# Patient Record
Sex: MALE | Race: BLACK OR AFRICAN AMERICAN
[De-identification: names, ages, dates, MRNs, and addresses within clinical notes are randomized per-mention and may not be internally consistent; named-entity substitution may affect disease eponyms.]

---

## 2020-10-19 ENCOUNTER — HOSPITAL ENCOUNTER (OUTPATIENT)
Dept: HOSPITAL 74 - JASU-SURG | Age: 70
Discharge: HOME | End: 2020-10-19
Attending: UROLOGY
Payer: COMMERCIAL

## 2020-10-19 VITALS — DIASTOLIC BLOOD PRESSURE: 76 MMHG | SYSTOLIC BLOOD PRESSURE: 137 MMHG | TEMPERATURE: 97.8 F | HEART RATE: 46 BPM

## 2020-10-19 VITALS — BODY MASS INDEX: 24.3 KG/M2

## 2020-10-19 DIAGNOSIS — N20.0: Primary | ICD-10-CM

## 2020-10-19 PROCEDURE — 0TF4XZZ FRAGMENTATION IN LEFT KIDNEY PELVIS, EXTERNAL APPROACH: ICD-10-PCS | Performed by: UROLOGY

## 2020-10-19 NOTE — OP
Operative Note





- Note:


Operative Date: 10/19/20


Pre-Operative Diagnosis: Left renal stone


Operation: Left ESWL


Findings: 





5 mm mid pole Left renal stone


Post-Operative Diagnosis: Same as Pre-op


Surgeon: Norman Frye


Anesthesia: Regional


Estimated Blood Loss (mls): 0


Operative Report Dictated: Yes

## 2020-10-19 NOTE — OP
DATE OF OPERATION:  10/19/2020

 

PREOPERATIVE DIAGNOSIS:  Left renal stone. 

 

POSTOPERATIVE DIAGNOSIS:  Left renal stone.  

 

PROCEDURE:  Left extracorporeal shockwave lithotripsy.  

 

ATTENDING:  Joshua Frye M.D. 

 

ANESTHESIA:  Fractional.  

 

DESCRIPTION OF PROCEDURE:  Patient was brought in the operating room, placed in a

supine position on the operating room table.  Ultrasonography and fluoroscopy were

performed.  A 5-mm left mid pole stone was identified.  Anesthesia and preoperative

antibiotics were then administered.  Shockwave lithotripsy was then started.  2500

impulses at 17 joules of power were administered to the stone under fluoroscopic and

ultrasonographic visualization.  Excellent fragmentation was noted.  There were no

complications noted.  

 

DISPOSITION:  To recovery room.  

 

 

JOSHUA NORRIS M.D.

 

[id number]

DD: 10/19/2020 16:49

DT: 10/19/2020 19:03

Job #:  [id number]

## 2023-03-10 ENCOUNTER — OFFICE (OUTPATIENT)
Dept: URBAN - METROPOLITAN AREA CLINIC 29 | Facility: CLINIC | Age: 73
Setting detail: OPHTHALMOLOGY
End: 2023-03-10
Payer: COMMERCIAL

## 2023-03-10 DIAGNOSIS — H16.223: ICD-10-CM

## 2023-03-10 DIAGNOSIS — H25.13: ICD-10-CM

## 2023-03-10 DIAGNOSIS — H40.003: ICD-10-CM

## 2023-03-10 PROCEDURE — 92083 EXTENDED VISUAL FIELD XM: CPT | Performed by: OPHTHALMOLOGY

## 2023-03-10 PROCEDURE — 92020 GONIOSCOPY: CPT | Performed by: OPHTHALMOLOGY

## 2023-03-10 PROCEDURE — 99213 OFFICE O/P EST LOW 20 MIN: CPT | Performed by: OPHTHALMOLOGY

## 2023-03-10 PROCEDURE — 92250 FUNDUS PHOTOGRAPHY W/I&R: CPT | Performed by: OPHTHALMOLOGY

## 2023-03-10 ASSESSMENT — TONOMETRY
OD_IOP_MMHG: 16
OS_IOP_MMHG: 19

## 2023-03-10 ASSESSMENT — VISUAL ACUITY
OS_BCVA: 20/20-2
OD_BCVA: 20/25-2

## 2023-03-10 ASSESSMENT — DECREASING TEAR LAKE - SEVERITY SCORE
OS_DEC_TEARLAKE: T
OD_DEC_TEARLAKE: T

## 2023-03-10 ASSESSMENT — CONFRONTATIONAL VISUAL FIELD TEST (CVF)
OS_FINDINGS: FULL
OD_FINDINGS: FULL

## 2023-05-23 ASSESSMENT — REFRACTION_AUTOREFRACTION
OD_AXIS: 173
OS_CYLINDER: +1.50
OS_SPHERE: +1.50
OD_CYLINDER: +2.00
OS_AXIS: 168
OD_SPHERE: +1.00

## 2023-05-23 ASSESSMENT — REFRACTION_CURRENTRX
OD_SPHERE: +1.00
OS_SPHERE: +1.25
OD_ADD: +2.75
OD_AXIS: 175
OS_SPHERE: +1.25
OD_AXIS: 5
OS_ADD: +3.00
OS_CYLINDER: SPH
OS_OVR_VA: 20/
OD_VPRISM_DIRECTION: PROGS
OS_CYLINDER: +0.50
OD_CYLINDER: +1.50
OD_ADD: +3.00
OS_ADD: +2.75
OD_SPHERE: +0.25
OS_AXIS: 159
OD_OVR_VA: 20/
OD_CYLINDER: +1.50
OD_OVR_VA: 20/
OS_OVR_VA: 20/
OS_VPRISM_DIRECTION: PROGS

## 2023-05-23 ASSESSMENT — REFRACTION_MANIFEST
OS_VA2: 20/20(J1+)
OS_VA1: 20/20
OD_SPHERE: +1.00
OS_SPHERE: +1.25
OD_VA1: 20/20
OU_VA: 20/20
OD_AXIS: 180
OD_CYLINDER: +2.00
OS_CYLINDER: +0.75
OD_ADD: +3.00
OS_ADD: +3.00
OD_VA2: 20/20(J1+)
OS_AXIS: 170

## 2023-05-23 ASSESSMENT — SPHEQUIV_DERIVED
OS_SPHEQUIV: 1.625
OS_SPHEQUIV: 2.25
OD_SPHEQUIV: 2
OD_SPHEQUIV: 2

## 2023-09-05 ENCOUNTER — OFFICE (OUTPATIENT)
Dept: URBAN - METROPOLITAN AREA CLINIC 29 | Facility: CLINIC | Age: 73
Setting detail: OPHTHALMOLOGY
End: 2023-09-05
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
## 2023-09-05 PROCEDURE — NO SHOW FE NO SHOW FEE: Performed by: OPHTHALMOLOGY

## 2023-10-30 ENCOUNTER — OFFICE (OUTPATIENT)
Dept: URBAN - METROPOLITAN AREA CLINIC 29 | Facility: CLINIC | Age: 73
Setting detail: OPHTHALMOLOGY
End: 2023-10-30
Payer: COMMERCIAL

## 2023-10-30 ENCOUNTER — RX ONLY (RX ONLY)
Age: 73
End: 2023-10-30

## 2023-10-30 DIAGNOSIS — H43.393: ICD-10-CM

## 2023-10-30 DIAGNOSIS — H40.003: ICD-10-CM

## 2023-10-30 DIAGNOSIS — H16.223: ICD-10-CM

## 2023-10-30 DIAGNOSIS — H11.153: ICD-10-CM

## 2023-10-30 DIAGNOSIS — H25.13: ICD-10-CM

## 2023-10-30 PROCEDURE — 92133 CPTRZD OPH DX IMG PST SGM ON: CPT | Performed by: OPHTHALMOLOGY

## 2023-10-30 PROCEDURE — 92014 COMPRE OPH EXAM EST PT 1/>: CPT | Performed by: OPHTHALMOLOGY

## 2023-10-30 ASSESSMENT — REFRACTION_MANIFEST
OD_CYLINDER: +2.00
OD_SPHERE: +1.00
OD_ADD: +3.00
OS_SPHERE: +1.25
OS_ADD: +3.00
OD_VA2: 20/20(J1+)
OS_AXIS: 170
OD_VA1: 20/20
OD_AXIS: 180
OS_VA1: 20/20
OU_VA: 20/20
OS_VA2: 20/20(J1+)
OS_CYLINDER: +0.75

## 2023-10-30 ASSESSMENT — DECREASING TEAR LAKE - SEVERITY SCORE
OD_DEC_TEARLAKE: T
OS_DEC_TEARLAKE: T

## 2023-10-30 ASSESSMENT — REFRACTION_CURRENTRX
OD_CYLINDER: +1.50
OD_OVR_VA: 20/
OD_CYLINDER: +1.50
OD_OVR_VA: 20/
OS_SPHERE: +1.25
OD_ADD: +2.75
OS_CYLINDER: SPH
OS_ADD: +3.00
OS_CYLINDER: +0.50
OS_SPHERE: +1.25
OD_AXIS: 5
OD_VPRISM_DIRECTION: PROGS
OS_VPRISM_DIRECTION: PROGS
OD_ADD: +3.00
OS_OVR_VA: 20/
OD_AXIS: 175
OS_AXIS: 159
OD_SPHERE: +1.00
OS_OVR_VA: 20/
OD_SPHERE: +0.25
OS_ADD: +2.75

## 2023-10-30 ASSESSMENT — REFRACTION_AUTOREFRACTION
OD_SPHERE: +1.00
OS_CYLINDER: +1.00
OD_AXIS: 180
OS_AXIS: 175
OS_SPHERE: +1.25
OD_CYLINDER: +1.50

## 2023-10-30 ASSESSMENT — VISUAL ACUITY
OS_BCVA: 20/25-2
OD_BCVA: 20/20-1

## 2023-10-30 ASSESSMENT — TONOMETRY
OS_IOP_MMHG: 18
OD_IOP_MMHG: 15

## 2023-10-30 ASSESSMENT — SPHEQUIV_DERIVED
OS_SPHEQUIV: 1.75
OD_SPHEQUIV: 1.75
OD_SPHEQUIV: 2
OS_SPHEQUIV: 1.625

## 2023-10-30 ASSESSMENT — CONFRONTATIONAL VISUAL FIELD TEST (CVF)
OS_FINDINGS: FULL
OD_FINDINGS: FULL

## 2024-02-09 ENCOUNTER — OFFICE (OUTPATIENT)
Dept: URBAN - METROPOLITAN AREA CLINIC 29 | Facility: CLINIC | Age: 74
Setting detail: OPHTHALMOLOGY
End: 2024-02-09
Payer: COMMERCIAL

## 2024-02-09 DIAGNOSIS — H52.4: ICD-10-CM

## 2024-02-09 PROCEDURE — 92015 DETERMINE REFRACTIVE STATE: CPT | Performed by: OPHTHALMOLOGY

## 2024-02-09 ASSESSMENT — REFRACTION_CURRENTRX
OS_AXIS: 159
OD_ADD: +2.75
OS_CYLINDER: +0.50
OD_OVR_VA: 20/
OS_SPHERE: +1.25
OS_ADD: +3.00
OS_ADD: +2.75
OD_SPHERE: +1.00
OD_AXIS: 175
OD_CYLINDER: +1.50
OD_CYLINDER: +1.50
OS_AXIS: 166
OS_OVR_VA: 20/
OS_SPHERE: +1.25
OD_VPRISM_DIRECTION: PROGS
OD_OVR_VA: 20/
OD_CYLINDER: +1.25
OS_ADD: +3.00
OS_SPHERE: +1.25
OD_SPHERE: +1.00
OD_OVR_VA: 20/
OD_ADD: +3.00
OS_CYLINDER: +0.50
OS_AXIS: 157
OS_OVR_VA: 20/
OS_CYLINDER: SPH
OS_SPHERE: +1.25
OD_ADD: +3.00
OD_SPHERE: +1.00
OD_SPHERE: +0.25
OS_VPRISM_DIRECTION: PROGS
OS_CYLINDER: +0.50
OD_ADD: +3.00
OS_ADD: +3.00
OD_AXIS: 4
OS_OVR_VA: 20/
OD_CYLINDER: +1.50
OD_AXIS: 5
OD_AXIS: 180

## 2024-02-09 ASSESSMENT — REFRACTION_MANIFEST
OD_ADD: +3.00
OD_VA2: 20/20(J1+)
OU_VA: 20/20
OS_VA1: 20/20
OD_CYLINDER: +1.75
OS_AXIS: 170
OS_SPHERE: +1.25
OS_VA1: 20/20
OD_ADD: +3.00
OD_CYLINDER: +2.00
OS_AXIS: 170
OD_SPHERE: +1.00
OD_VA2: 20/20(J1+)
OS_ADD: +3.00
OD_AXIS: 180
OS_CYLINDER: +1.50
OS_CYLINDER: +0.75
OD_AXIS: 180
OD_VA1: 20/20
OS_SPHERE: +1.25
OS_ADD: +3.00
OD_SPHERE: +1.00
OS_VA2: 20/20(J1+)
OS_VA2: 20/20(J1+)
OU_VA: 20/15-1
OD_VA1: 20/20

## 2024-02-09 ASSESSMENT — REFRACTION_AUTOREFRACTION
OD_SPHERE: +1.00
OS_SPHERE: +1.50
OS_AXIS: 172
OD_AXIS: 175
OS_CYLINDER: +1.25
OD_CYLINDER: +1.75

## 2024-02-09 ASSESSMENT — DECREASING TEAR LAKE - SEVERITY SCORE
OD_DEC_TEARLAKE: T
OS_DEC_TEARLAKE: T

## 2024-02-09 ASSESSMENT — CONFRONTATIONAL VISUAL FIELD TEST (CVF)
OD_FINDINGS: FULL
OS_FINDINGS: FULL

## 2024-02-09 ASSESSMENT — SPHEQUIV_DERIVED
OD_SPHEQUIV: 1.875
OD_SPHEQUIV: 2
OS_SPHEQUIV: 2.125
OS_SPHEQUIV: 2
OD_SPHEQUIV: 1.875
OS_SPHEQUIV: 1.625

## 2024-04-30 ENCOUNTER — OFFICE (OUTPATIENT)
Dept: URBAN - METROPOLITAN AREA CLINIC 29 | Facility: CLINIC | Age: 74
Setting detail: OPHTHALMOLOGY
End: 2024-04-30
Payer: COMMERCIAL

## 2024-04-30 DIAGNOSIS — H25.13: ICD-10-CM

## 2024-04-30 DIAGNOSIS — H40.003: ICD-10-CM

## 2024-04-30 DIAGNOSIS — H11.153: ICD-10-CM

## 2024-04-30 DIAGNOSIS — H16.223: ICD-10-CM

## 2024-04-30 PROCEDURE — 99213 OFFICE O/P EST LOW 20 MIN: CPT | Performed by: OPHTHALMOLOGY

## 2024-04-30 PROCEDURE — 92250 FUNDUS PHOTOGRAPHY W/I&R: CPT | Performed by: OPHTHALMOLOGY

## 2024-04-30 PROCEDURE — 92083 EXTENDED VISUAL FIELD XM: CPT | Performed by: OPHTHALMOLOGY

## 2024-08-09 ENCOUNTER — OFFICE (OUTPATIENT)
Dept: URBAN - METROPOLITAN AREA CLINIC 29 | Facility: CLINIC | Age: 74
Setting detail: OPHTHALMOLOGY
End: 2024-08-09
Payer: MEDICARE

## 2024-08-09 DIAGNOSIS — H25.13: ICD-10-CM

## 2024-08-09 DIAGNOSIS — H16.223: ICD-10-CM

## 2024-08-09 DIAGNOSIS — H11.153: ICD-10-CM

## 2024-08-09 PROCEDURE — 92020 GONIOSCOPY: CPT | Performed by: OPHTHALMOLOGY

## 2024-08-09 PROCEDURE — 99213 OFFICE O/P EST LOW 20 MIN: CPT | Performed by: OPHTHALMOLOGY

## 2024-08-09 ASSESSMENT — CONFRONTATIONAL VISUAL FIELD TEST (CVF)
OS_FINDINGS: FULL
OD_FINDINGS: FULL

## 2024-12-18 ENCOUNTER — OFFICE (OUTPATIENT)
Dept: URBAN - METROPOLITAN AREA CLINIC 29 | Facility: CLINIC | Age: 74
Setting detail: OPHTHALMOLOGY
End: 2024-12-18
Payer: MEDICARE

## 2024-12-18 DIAGNOSIS — H16.223: ICD-10-CM

## 2024-12-18 DIAGNOSIS — H11.153: ICD-10-CM

## 2024-12-18 DIAGNOSIS — H43.393: ICD-10-CM

## 2024-12-18 DIAGNOSIS — H25.13: ICD-10-CM

## 2024-12-18 DIAGNOSIS — H40.003: ICD-10-CM

## 2024-12-18 PROCEDURE — 92133 CPTRZD OPH DX IMG PST SGM ON: CPT | Performed by: OPHTHALMOLOGY

## 2024-12-18 PROCEDURE — 92014 COMPRE OPH EXAM EST PT 1/>: CPT | Performed by: OPHTHALMOLOGY

## 2024-12-18 ASSESSMENT — REFRACTION_CURRENTRX
OS_CYLINDER: +1.25
OD_CYLINDER: +1.50
OD_AXIS: 180
OS_CYLINDER: +0.50
OD_ADD: +2.50
OD_OVR_VA: 20/
OD_AXIS: 002
OS_SPHERE: +1.25
OS_SPHERE: +1.25
OS_AXIS: 166
OS_CYLINDER: SPH
OD_VPRISM_DIRECTION: PROGS
OS_SPHERE: +1.25
OD_ADD: +3.00
OS_SPHERE: +1.25
OD_AXIS: 5
OS_ADD: +2.50
OS_AXIS: 170
OS_ADD: +2.75
OS_CYLINDER: +0.50
OD_SPHERE: +1.00
OS_OVR_VA: 20/
OS_OVR_VA: 20/
OS_AXIS: 157
OS_ADD: +3.00
OD_AXIS: 180
OD_SPHERE: +0.25
OD_CYLINDER: +1.50
OS_ADD: +3.00
OD_ADD: +2.75
OS_VPRISM_DIRECTION: PROGS
OD_SPHERE: +1.00
OS_AXIS: 159
OD_AXIS: 175
OD_SPHERE: +1.25
OS_AXIS: 10
OS_VPRISM_DIRECTION: PROGS
OS_CYLINDER: +0.50
OS_CYLINDER: +1.50
OD_CYLINDER: +1.25
OS_ADD: +2.75
OD_ADD: +2.75
OS_SPHERE: +1.25
OD_CYLINDER: +1.50
OS_SPHERE: +1.25
OD_SPHERE: +1.00
OD_CYLINDER: +2.00
OD_OVR_VA: 20/
OS_OVR_VA: 20/
OS_ADD: +3.00
OD_ADD: +3.00
OD_SPHERE: +1.00
OD_VPRISM_DIRECTION: PROGS
OD_CYLINDER: +1.50
OD_AXIS: 4
OD_ADD: +3.00
OD_OVR_VA: 20/

## 2024-12-18 ASSESSMENT — REFRACTION_MANIFEST
OD_AXIS: 180
OS_SPHERE: +1.25
OD_VA2: 20/20(J1+)
OS_SPHERE: +1.25
OS_AXIS: 170
OS_VA1: 20/20
OS_VA2: 20/20(J1+)
OU_VA: 20/20
OD_VA1: 20/20
OS_ADD: +3.00
OS_VA1: 20/20
OD_ADD: +3.00
OS_ADD: +3.00
OD_VA1: 20/20
OD_CYLINDER: +2.00
OU_VA: 20/15-1
OD_SPHERE: +1.00
OD_AXIS: 180
OS_VA2: 20/20(J1+)
OD_CYLINDER: +1.75
OS_AXIS: 170
OS_CYLINDER: +1.50
OD_SPHERE: +1.00
OD_VA2: 20/20(J1+)
OS_CYLINDER: +0.75
OD_ADD: +3.00

## 2024-12-18 ASSESSMENT — REFRACTION_AUTOREFRACTION
OS_CYLINDER: +1.75
OS_SPHERE: +1.50
OD_SPHERE: +1.25
OD_AXIS: 175
OS_AXIS: 165
OD_CYLINDER: +2.00

## 2024-12-18 ASSESSMENT — TONOMETRY
OS_IOP_MMHG: 17
OD_IOP_MMHG: 16

## 2024-12-18 ASSESSMENT — CONFRONTATIONAL VISUAL FIELD TEST (CVF)
OD_FINDINGS: FULL
OS_FINDINGS: FULL

## 2024-12-18 ASSESSMENT — VISUAL ACUITY
OS_BCVA: 20/25-2
OD_BCVA: 20/20-1

## 2025-04-01 ENCOUNTER — OFFICE (OUTPATIENT)
Dept: URBAN - METROPOLITAN AREA CLINIC 29 | Facility: CLINIC | Age: 75
Setting detail: OPHTHALMOLOGY
End: 2025-04-01
Payer: MEDICARE

## 2025-04-01 DIAGNOSIS — H40.003: ICD-10-CM

## 2025-04-01 DIAGNOSIS — H25.13: ICD-10-CM

## 2025-04-01 DIAGNOSIS — H11.153: ICD-10-CM

## 2025-04-01 DIAGNOSIS — H16.223: ICD-10-CM

## 2025-04-01 PROCEDURE — 92020 GONIOSCOPY: CPT | Performed by: OPHTHALMOLOGY

## 2025-04-01 PROCEDURE — 99213 OFFICE O/P EST LOW 20 MIN: CPT | Performed by: OPHTHALMOLOGY

## 2025-04-01 PROCEDURE — 92250 FUNDUS PHOTOGRAPHY W/I&R: CPT | Performed by: OPHTHALMOLOGY

## 2025-04-01 ASSESSMENT — REFRACTION_CURRENTRX
OS_OVR_VA: 20/
OS_CYLINDER: +1.50
OD_SPHERE: +1.00
OS_ADD: +3.00
OD_SPHERE: +1.00
OD_ADD: +3.00
OS_SPHERE: +1.25
OS_AXIS: 157
OD_CYLINDER: +1.50
OD_ADD: +2.50
OD_SPHERE: +1.00
OS_AXIS: 166
OD_ADD: +2.75
OS_CYLINDER: +1.25
OD_VPRISM_DIRECTION: PROGS
OD_CYLINDER: +2.00
OS_CYLINDER: +0.50
OD_AXIS: 0
OD_OVR_VA: 20/
OD_CYLINDER: +1.75
OS_SPHERE: +1.25
OS_ADD: +2.50
OS_ADD: +2.75
OD_AXIS: 4
OD_AXIS: 5
OS_SPHERE: +1.25
OD_SPHERE: +1.25
OS_ADD: +3.00
OD_SPHERE: +0.25
OS_ADD: +2.75
OS_CYLINDER: SPH
OD_ADD: +2.50
OS_SPHERE: +1.25
OD_AXIS: 175
OD_AXIS: 002
OS_CYLINDER: +1.50
OD_CYLINDER: +1.50
OD_ADD: +3.00
OD_CYLINDER: +1.50
OS_CYLINDER: +0.50
OS_SPHERE: +1.25
OS_OVR_VA: 20/
OS_AXIS: 171
OD_AXIS: 180
OD_ADD: +2.75
OS_SPHERE: +1.50
OS_OVR_VA: 20/
OD_CYLINDER: +1.25
OD_OVR_VA: 20/
OS_ADD: +3.00
OS_VPRISM_DIRECTION: PROGS
OD_OVR_VA: 20/
OS_VPRISM_DIRECTION: PROGS
OD_CYLINDER: +1.50
OD_SPHERE: +1.25
OD_AXIS: 180
OD_VPRISM_DIRECTION: PROGS
OD_ADD: +3.00
OS_AXIS: 10
OD_SPHERE: +1.00
OS_ADD: +2.50
OS_SPHERE: +1.25
OS_AXIS: 170
OS_AXIS: 159
OS_CYLINDER: +0.50

## 2025-04-01 ASSESSMENT — TONOMETRY
OD_IOP_MMHG: 17
OS_IOP_MMHG: 17

## 2025-04-01 ASSESSMENT — REFRACTION_AUTOREFRACTION
OS_SPHERE: +1.50
OS_CYLINDER: +1.50
OD_AXIS: 173
OD_SPHERE: +1.25
OS_AXIS: 170
OD_CYLINDER: +2.00

## 2025-04-01 ASSESSMENT — REFRACTION_MANIFEST
OU_VA: 20/20
OS_VA2: 20/20(J1+)
OS_CYLINDER: +0.75
OS_VA1: 20/20
OD_AXIS: 180
OS_ADD: +3.00
OD_VA1: 20/20
OS_ADD: +3.00
OD_VA2: 20/20(J1+)
OD_SPHERE: +1.00
OS_SPHERE: +1.25
OS_AXIS: 170
OU_VA: 20/15-1
OD_ADD: +3.00
OD_SPHERE: +1.00
OS_CYLINDER: +1.50
OS_SPHERE: +1.25
OS_VA2: 20/20(J1+)
OD_VA2: 20/20(J1+)
OS_VA1: 20/20
OD_CYLINDER: +2.00
OD_ADD: +3.00
OD_AXIS: 180
OD_VA1: 20/20
OS_AXIS: 170
OD_CYLINDER: +1.75

## 2025-04-01 ASSESSMENT — VISUAL ACUITY
OD_BCVA: 20/25
OS_BCVA: 20/20-2

## 2025-04-01 ASSESSMENT — SUPERFICIAL PUNCTATE KERATITIS (SPK)
OS_SPK: 1+
OD_SPK: 1+

## 2025-04-01 ASSESSMENT — CONFRONTATIONAL VISUAL FIELD TEST (CVF)
OD_FINDINGS: FULL
OS_FINDINGS: FULL

## 2025-08-13 ENCOUNTER — OFFICE (OUTPATIENT)
Dept: URBAN - METROPOLITAN AREA CLINIC 29 | Facility: CLINIC | Age: 75
Setting detail: OPHTHALMOLOGY
End: 2025-08-13
Payer: MEDICARE

## 2025-08-13 DIAGNOSIS — H40.003: ICD-10-CM

## 2025-08-13 DIAGNOSIS — H25.13: ICD-10-CM

## 2025-08-13 DIAGNOSIS — H11.153: ICD-10-CM

## 2025-08-13 DIAGNOSIS — H16.223: ICD-10-CM

## 2025-08-13 PROCEDURE — 99213 OFFICE O/P EST LOW 20 MIN: CPT | Performed by: OPHTHALMOLOGY

## 2025-08-13 PROCEDURE — 92083 EXTENDED VISUAL FIELD XM: CPT | Performed by: OPHTHALMOLOGY

## 2025-08-13 ASSESSMENT — REFRACTION_CURRENTRX
OD_SPHERE: +1.00
OS_SPHERE: +1.25
OS_CYLINDER: +0.50
OS_OVR_VA: 20/
OD_SPHERE: +0.25
OD_CYLINDER: +1.75
OD_CYLINDER: +1.50
OD_ADD: +2.50
OS_VPRISM_DIRECTION: PROGS
OS_CYLINDER: +0.50
OD_AXIS: 002
OS_AXIS: 166
OS_CYLINDER: SPH
OD_AXIS: 180
OS_CYLINDER: +1.50
OD_AXIS: 180
OS_OVR_VA: 20/
OD_CYLINDER: +1.50
OS_AXIS: 171
OD_ADD: +2.75
OS_ADD: +3.00
OD_SPHERE: +1.25
OS_ADD: +2.75
OD_SPHERE: +1.00
OS_ADD: +2.50
OD_CYLINDER: +2.00
OD_OVR_VA: 20/
OD_VPRISM_DIRECTION: PROGS
OS_AXIS: 159
OD_ADD: +3.00
OS_AXIS: 170
OD_ADD: +2.50
OS_CYLINDER: +0.50
OS_SPHERE: +1.25
OD_CYLINDER: +1.50
OS_ADD: +2.50
OD_AXIS: 175
OS_SPHERE: +1.50
OD_SPHERE: +1.25
OS_SPHERE: +1.25
OD_CYLINDER: +1.50
OD_CYLINDER: +1.25
OS_ADD: +2.75
OD_ADD: +3.00
OS_CYLINDER: +1.25
OS_SPHERE: +1.25
OD_OVR_VA: 20/
OD_ADD: +2.75
OS_VPRISM_DIRECTION: PROGS
OD_AXIS: 4
OS_ADD: +3.00
OD_VPRISM_DIRECTION: PROGS
OD_AXIS: 0
OS_AXIS: 157
OD_SPHERE: +1.00
OS_SPHERE: +1.25
OD_OVR_VA: 20/
OS_ADD: +3.00
OS_OVR_VA: 20/
OS_SPHERE: +1.25
OS_CYLINDER: +1.50
OS_AXIS: 10
OD_ADD: +3.00
OD_AXIS: 5
OD_SPHERE: +1.00

## 2025-08-13 ASSESSMENT — REFRACTION_AUTOREFRACTION
OS_CYLINDER: +1.50
OS_AXIS: 170
OD_CYLINDER: +2.00
OD_SPHERE: +1.25
OD_AXIS: 173
OS_SPHERE: +1.50

## 2025-08-13 ASSESSMENT — REFRACTION_MANIFEST
OD_VA2: 20/20(J1+)
OS_CYLINDER: +1.50
OD_ADD: +3.00
OS_VA2: 20/20(J1+)
OU_VA: 20/15-1
OS_SPHERE: +1.25
OU_VA: 20/20
OS_SPHERE: +1.25
OD_VA1: 20/20
OD_SPHERE: +1.00
OS_AXIS: 170
OS_AXIS: 170
OD_SPHERE: +1.00
OD_CYLINDER: +1.75
OD_AXIS: 180
OD_ADD: +3.00
OD_AXIS: 180
OS_VA1: 20/20
OS_ADD: +3.00
OD_VA1: 20/20
OD_VA2: 20/20(J1+)
OD_CYLINDER: +2.00
OS_ADD: +3.00
OS_CYLINDER: +0.75
OS_VA2: 20/20(J1+)
OS_VA1: 20/20

## 2025-08-13 ASSESSMENT — DECREASING TEAR LAKE - SEVERITY SCORE
OD_DEC_TEARLAKE: 1+
OS_DEC_TEARLAKE: 1+ 2+

## 2025-08-13 ASSESSMENT — VISUAL ACUITY
OD_BCVA: 20/20-2
OS_BCVA: 20/25+1

## 2025-08-13 ASSESSMENT — SUPERFICIAL PUNCTATE KERATITIS (SPK)
OS_SPK: 1+
OD_SPK: 1+

## 2025-08-13 ASSESSMENT — CONFRONTATIONAL VISUAL FIELD TEST (CVF)
OS_FINDINGS: FULL
OD_FINDINGS: FULL

## 2025-08-13 ASSESSMENT — TONOMETRY
OS_IOP_MMHG: 18
OD_IOP_MMHG: 17